# Patient Record
Sex: MALE | Race: OTHER
[De-identification: names, ages, dates, MRNs, and addresses within clinical notes are randomized per-mention and may not be internally consistent; named-entity substitution may affect disease eponyms.]

---

## 2020-01-22 ENCOUNTER — HOSPITAL ENCOUNTER (EMERGENCY)
Dept: HOSPITAL 56 - MW.ED | Age: 35
Discharge: HOME | End: 2020-01-22
Payer: COMMERCIAL

## 2020-01-22 DIAGNOSIS — F17.210: ICD-10-CM

## 2020-01-22 DIAGNOSIS — Z91.018: ICD-10-CM

## 2020-01-22 DIAGNOSIS — J03.00: Primary | ICD-10-CM

## 2020-01-22 DIAGNOSIS — Z91.09: ICD-10-CM

## 2020-01-22 NOTE — EDM.PDOC
ED HPI GENERAL MEDICAL PROBLEM





- General


Chief Complaint: ENT Problem


Stated Complaint: NOT FEELING WELL


Time Seen by Provider: 01/22/20 11:58


Source of Information: Reports: Patient


History Limitations: Reports: No Limitations





- History of Present Illness


INITIAL COMMENTS - FREE TEXT/NARRATIVE: 





HISTORY AND PHYSICAL:





History of present illness:


PAtient is a 34-year-old male presents to the ED with complaint of sore throat 

2 days.  Patient reports subjective fever as well as ear pain and mild cough.  

Denies any nausea, vomiting, abdominal pain, chest pain, shortness of breath.  

Denies significant past medical history





Review of systems: 


As per history of present illness and below otherwise all systems reviewed and 

negative.





Past medical history: 


As per history of present illness and as reviewed below otherwise 

noncontributory.





Surgical history: 


As per history of present illness and as reviewed below otherwise 

noncontributory.





Social history: 


No reported history of drug or alcohol abuse.





Family history: 


As per history of present illness and as reviewed below otherwise 

noncontributory.





Physical exam:


General: Patient sitting comfortably in no acute distress and nontoxic appearing


HEENT: Atraumatic, normocephalic, pupils reactive, negative for conjunctival 

pallor or scleral icterus, mucous membranes moist, throat clear, neck supple, 

nontender, trachea midline. No meningeal signs. 


Lungs: Clear to auscultation, breath sounds equal bilaterally, chest nontender.


Heart: S1S2, regular, negative for clicks, rubs, or overt murmur.


Abdomen: Soft, nondistended, nontender. Negative for masses or 

hepatosplenomegaly. Negative for costovertebral tenderness. No rigidity, rebound

, guarding.


Pelvis: Stable nontender.


Genitourinary: Deferred.


Rectal: Deferred.


Extremities: Atraumatic, negative for cords or calf pain. Neurovascular 

unremarkable.


Neuro: Awake, alert, oriented. Cranial nerves II through XII unremarkable. 

Cerebellum unremarkable. Motor and sensory unremarkable throughout. Exam 

nonfocal.





Notes: 





Diagnostics:


Influenza, rapid strep





Therapeutics:


None





Prescriptions:


Penicillin 





Impression: 


Strep tonsillitis





Plan:


Take antibiotic as instructed


Alternate Tylenol and ibuprofen as needed


Follow-up with primary care provider


Return to ED as needed as discussed





Definitive disposition and diagnosis as appropriate pending reevaluation and 

review of above.








  ** Throat


Pain Score (Numeric/FACES): 7





- Related Data


 Allergies











Allergy/AdvReac Type Severity Reaction Status Date / Time


 


pollen extracts Allergy  Rash Verified 01/22/20 11:45


 


strawberry Allergy  Rash Verified 01/22/20 11:45


 


tuna oil Allergy  Rash Verified 01/22/20 11:45











Home Meds: 


 Home Meds





Penicillin V Potassium 500 mg PO BID 10 Days #20 tablet 01/22/20 [Rx]











Past Medical History





- Infectious Disease History


Infectious Disease History: Reports: None





Social & Family History





- Family History


Family Medical History: Noncontributory





- Tobacco Use


Smoking Status *Q: Current Every Day Smoker


Years of Tobacco use: 10


Packs/Tins Daily: 1





- Recreational Drug Use


Recreational Drug Use: No





ED ROS ENT





- Review of Systems


Review Of Systems: Comprehensive ROS is negative, except as noted in HPI.





ED EXAM, ENT





- Physical Exam


Exam: See Below (see dictation)





Course





- Vital Signs


Last Recorded V/S: 





 Last Vital Signs











Temp  97 F   01/22/20 11:45


 


Pulse  84   01/22/20 11:45


 


Resp  16   01/22/20 11:45


 


BP  127/67   01/22/20 11:45


 


Pulse Ox  98   01/22/20 11:45














- Orders/Labs/Meds


Orders: 





 Active Orders 24 hr











 Category Date Time Status


 


 INFLUENZA A+B AG SCREEN [RM] Stat Lab  01/22/20 11:49 Received


 


 STREP SCRN A RAPID W CULT CONF [RM] Stat Lab  01/22/20 11:48 Ordered














Departure





- Departure


Time of Disposition: 12:14


Disposition: Home, Self-Care 01


Condition: Good


Clinical Impression: 


 Strep tonsillitis








- Discharge Information


Referrals: 


PCP,Not In Area [Primary Care Provider] - 


Additional Instructions: 


The following information is given to patients seen in the emergency department 

who are being discharged to home. This information is to outline your options 

for follow-up care. We provide all patients seen in our emergency department 

with a follow-up referral.





The need for follow-up, as well as the timing and circumstances, are variable 

depending upon the specifics of your emergency department visit.





If you don't have a primary care physician on staff, we will provide you with a 

referral. We always advise you to contact your personal physician following an 

emergency department visit to inform them of the circumstance of the visit and 

for follow-up with them and/or the need for any referrals to a consulting 

specialist.





The emergency department will also refer you to a specialist when appropriate. 

This referral assures that you have the opportunity for follow-up care with a 

specialist. All of these measure are taken in an effort to provide you with 

optimal care, which includes your follow-up.





Under all circumstances we always encourage you to contact your private 

physician who remains a resource for coordinating your care. When calling for 

follow-up care, please make the office aware that this follow-up is from your 

recent emergency room visit. If for any reason you are refused follow-up, 

please contact the  Emergency 

Department at [phone] and asked to speak to the emergency department 

charge nurse.








Primary Care


[address] [place] [id number]


Phone: [phone]


Fax: [phone]





[address] [address]


Phone: [phone]


Fax: [phone]








Take antibiotic as instructed


Alternate Tylenol and ibuprofen as needed


Follow up with primary care provider


Return to ED as needed as discussed














Sepsis Event Note





- Evaluation


Sepsis Screening Result: No Definite Risk





- Focused Exam


Vital Signs: 





 Vital Signs











  Temp Pulse Resp BP Pulse Ox


 


 01/22/20 11:45  97 F  84  16  127/67  98











Date Exam was Performed: 01/22/20


Time Exam was Performed: 11:58





- My Orders


Last 24 Hours: 





My Active Orders





01/22/20 11:48


STREP SCRN A RAPID W CULT CONF [RM] Stat 





01/22/20 11:49


INFLUENZA A+B AG SCREEN [] Stat 














- Assessment/Plan


Last 24 Hours: 





My Active Orders





01/22/20 11:48


STREP SCRN A RAPID W CULT CONF [RM] Stat 





01/22/20 11:49


INFLUENZA A+B AG SCREEN [] Stat